# Patient Record
Sex: MALE | Race: ASIAN | NOT HISPANIC OR LATINO | URBAN - METROPOLITAN AREA
[De-identification: names, ages, dates, MRNs, and addresses within clinical notes are randomized per-mention and may not be internally consistent; named-entity substitution may affect disease eponyms.]

---

## 2019-12-31 ENCOUNTER — EMERGENCY (EMERGENCY)
Facility: HOSPITAL | Age: 59
LOS: 1 days | End: 2019-12-31
Attending: EMERGENCY MEDICINE
Payer: COMMERCIAL

## 2019-12-31 VITALS
RESPIRATION RATE: 18 BRPM | WEIGHT: 164.91 LBS | HEIGHT: 71 IN | DIASTOLIC BLOOD PRESSURE: 68 MMHG | TEMPERATURE: 99 F | HEART RATE: 75 BPM | SYSTOLIC BLOOD PRESSURE: 104 MMHG

## 2019-12-31 LAB
ALBUMIN SERPL ELPH-MCNC: 4.1 G/DL — SIGNIFICANT CHANGE UP (ref 3.3–5)
ALP SERPL-CCNC: 135 U/L — HIGH (ref 40–120)
ALT FLD-CCNC: 19 U/L — SIGNIFICANT CHANGE UP (ref 10–45)
ANION GAP SERPL CALC-SCNC: 17 MMOL/L — SIGNIFICANT CHANGE UP (ref 5–17)
AST SERPL-CCNC: 22 U/L — SIGNIFICANT CHANGE UP (ref 10–40)
BASOPHILS # BLD AUTO: 0 K/UL — SIGNIFICANT CHANGE UP (ref 0–0.2)
BASOPHILS NFR BLD AUTO: 0 % — SIGNIFICANT CHANGE UP (ref 0–2)
BILIRUB SERPL-MCNC: 0.3 MG/DL — SIGNIFICANT CHANGE UP (ref 0.2–1.2)
BUN SERPL-MCNC: 13 MG/DL — SIGNIFICANT CHANGE UP (ref 7–23)
CALCIUM SERPL-MCNC: 9 MG/DL — SIGNIFICANT CHANGE UP (ref 8.4–10.5)
CHLORIDE SERPL-SCNC: 99 MMOL/L — SIGNIFICANT CHANGE UP (ref 96–108)
CO2 SERPL-SCNC: 20 MMOL/L — LOW (ref 22–31)
CREAT SERPL-MCNC: 0.53 MG/DL — SIGNIFICANT CHANGE UP (ref 0.5–1.3)
EOSINOPHIL # BLD AUTO: 0.07 K/UL — SIGNIFICANT CHANGE UP (ref 0–0.5)
EOSINOPHIL NFR BLD AUTO: 2 % — SIGNIFICANT CHANGE UP (ref 0–6)
GLUCOSE SERPL-MCNC: 184 MG/DL — HIGH (ref 70–99)
HCT VFR BLD CALC: 41.5 % — SIGNIFICANT CHANGE UP (ref 39–50)
HGB BLD-MCNC: 13.7 G/DL — SIGNIFICANT CHANGE UP (ref 13–17)
LYMPHOCYTES # BLD AUTO: 0.88 K/UL — LOW (ref 1–3.3)
LYMPHOCYTES # BLD AUTO: 24 % — SIGNIFICANT CHANGE UP (ref 13–44)
MAGNESIUM SERPL-MCNC: 2.4 MG/DL — SIGNIFICANT CHANGE UP (ref 1.6–2.6)
MANUAL SMEAR VERIFICATION: SIGNIFICANT CHANGE UP
MCHC RBC-ENTMCNC: 29.5 PG — SIGNIFICANT CHANGE UP (ref 27–34)
MCHC RBC-ENTMCNC: 33 GM/DL — SIGNIFICANT CHANGE UP (ref 32–36)
MCV RBC AUTO: 89.2 FL — SIGNIFICANT CHANGE UP (ref 80–100)
MONOCYTES # BLD AUTO: 0.4 K/UL — SIGNIFICANT CHANGE UP (ref 0–0.9)
MONOCYTES NFR BLD AUTO: 11 % — SIGNIFICANT CHANGE UP (ref 2–14)
NEUTROPHILS # BLD AUTO: 1.98 K/UL — SIGNIFICANT CHANGE UP (ref 1.8–7.4)
NEUTROPHILS NFR BLD AUTO: 53 % — SIGNIFICANT CHANGE UP (ref 43–77)
NEUTS BAND # BLD: 1 % — SIGNIFICANT CHANGE UP (ref 0–8)
NRBC # BLD: 0 /100 — SIGNIFICANT CHANGE UP (ref 0–0)
PHOSPHATE SERPL-MCNC: 3.9 MG/DL — SIGNIFICANT CHANGE UP (ref 2.5–4.5)
PLAT MORPH BLD: NORMAL — SIGNIFICANT CHANGE UP
PLATELET # BLD AUTO: 235 K/UL — SIGNIFICANT CHANGE UP (ref 150–400)
POTASSIUM SERPL-MCNC: 4.3 MMOL/L — SIGNIFICANT CHANGE UP (ref 3.5–5.3)
POTASSIUM SERPL-SCNC: 4.3 MMOL/L — SIGNIFICANT CHANGE UP (ref 3.5–5.3)
PROT SERPL-MCNC: 7.7 G/DL — SIGNIFICANT CHANGE UP (ref 6–8.3)
RBC # BLD: 4.65 M/UL — SIGNIFICANT CHANGE UP (ref 4.2–5.8)
RBC # FLD: 12.6 % — SIGNIFICANT CHANGE UP (ref 10.3–14.5)
RBC BLD AUTO: NORMAL — SIGNIFICANT CHANGE UP
SODIUM SERPL-SCNC: 136 MMOL/L — SIGNIFICANT CHANGE UP (ref 135–145)
VARIANT LYMPHS # BLD: 9 % — HIGH (ref 0–6)
WBC # BLD: 3.67 K/UL — LOW (ref 3.8–10.5)
WBC # FLD AUTO: 3.67 K/UL — LOW (ref 3.8–10.5)

## 2019-12-31 PROCEDURE — 99284 EMERGENCY DEPT VISIT MOD MDM: CPT | Mod: 25

## 2019-12-31 PROCEDURE — 70487 CT MAXILLOFACIAL W/DYE: CPT | Mod: 26

## 2019-12-31 PROCEDURE — 80053 COMPREHEN METABOLIC PANEL: CPT

## 2019-12-31 PROCEDURE — 85027 COMPLETE CBC AUTOMATED: CPT

## 2019-12-31 PROCEDURE — 84100 ASSAY OF PHOSPHORUS: CPT

## 2019-12-31 PROCEDURE — 70487 CT MAXILLOFACIAL W/DYE: CPT

## 2019-12-31 PROCEDURE — 83735 ASSAY OF MAGNESIUM: CPT

## 2019-12-31 PROCEDURE — 99284 EMERGENCY DEPT VISIT MOD MDM: CPT

## 2019-12-31 RX ORDER — SODIUM CHLORIDE 9 MG/ML
1000 INJECTION INTRAMUSCULAR; INTRAVENOUS; SUBCUTANEOUS ONCE
Refills: 0 | Status: COMPLETED | OUTPATIENT
Start: 2019-12-31 | End: 2019-12-31

## 2019-12-31 RX ADMIN — SODIUM CHLORIDE 1000 MILLILITER(S): 9 INJECTION INTRAMUSCULAR; INTRAVENOUS; SUBCUTANEOUS at 22:17

## 2019-12-31 NOTE — ED PROVIDER NOTE - PATIENT PORTAL LINK FT
You can access the FollowMyHealth Patient Portal offered by [facility] by registering at the following website: [email or web address]. By joining [employer]’s FollowMyHealth portal, you will also be able to view your health information using other applications (apps) compatible with our system.

## 2019-12-31 NOTE — ED PROVIDER NOTE - ATTENDING CONTRIBUTION TO CARE
Pt with b/l jaw pain and swelling.  Max temp at home 99F.  No palpable LNA, +swelling and td at angle of jaw, clear lungs, abdomen soft, nt.  Advised pt if workup today is negative, will need to see PCP with today's ct and lab results for concern for possible viral vs recurrence of malignancy process.

## 2019-12-31 NOTE — ED PROVIDER NOTE - OBJECTIVE STATEMENT
59M PMH HTN, HLD, gastric cancer s/p resection presenting with acute onset bilateral cheek swelling x 2 days.  Pt states he started noticing his cheeks were swollen yesterday around parotid gland.  Denies discharge at this time. Pt denies fevers, chills, nausea, vomiting.  Denies sick contacts.  Pt came to ED today as family insisted he come for further care.

## 2019-12-31 NOTE — ED PROVIDER NOTE - PROGRESS NOTE DETAILS
Dr. Cortes Note: pt was told to f/u PCP at scheduled appt Monday with copies of all results for further treatment and possible further testing given prior h/o gastric cancer.

## 2019-12-31 NOTE — ED ADULT NURSE NOTE - OBJECTIVE STATEMENT
59YOM pmh abdominal CA 2011, in recovery presents to ED c/o bilateral jaw swelling x 2 days. Pt endorses ear tingling bilaterally, Bilateral jaw noted to be mindly swollen.  Denies HA, weakness, SOB, CP, abd pain, N/V/D, burning upon urination.  Safety and comfort measures provided. Will continue to monitor.

## 2019-12-31 NOTE — ED ADULT NURSE NOTE - NSIMPLEMENTINTERV_GEN_ALL_ED
Implemented All Universal Safety Interventions:  [place] to call system. Call bell, personal items and telephone within reach. Instruct patient to call for assistance. Room bathroom lighting operational. Non-slip footwear when patient is off stretcher. Physically safe environment: no spills, clutter or unnecessary equipment. Stretcher in lowest position, wheels locked, appropriate side rails in place.

## 2020-01-01 VITALS
RESPIRATION RATE: 18 BRPM | HEART RATE: 67 BPM | DIASTOLIC BLOOD PRESSURE: 64 MMHG | SYSTOLIC BLOOD PRESSURE: 101 MMHG | OXYGEN SATURATION: 99 % | TEMPERATURE: 99 F

## 2021-09-06 ENCOUNTER — EMERGENCY (EMERGENCY)
Facility: HOSPITAL | Age: 61
LOS: 1 days | Discharge: HOME | End: 2021-09-06
Admitting: EMERGENCY MEDICINE
Payer: MEDICARE

## 2021-09-06 VITALS
HEIGHT: 71 IN | HEART RATE: 68 BPM | RESPIRATION RATE: 20 BRPM | OXYGEN SATURATION: 98 % | DIASTOLIC BLOOD PRESSURE: 63 MMHG | SYSTOLIC BLOOD PRESSURE: 120 MMHG | TEMPERATURE: 98 F

## 2021-09-06 PROCEDURE — 12011 RPR F/E/E/N/L/M 2.5 CM/<: CPT

## 2021-09-06 PROCEDURE — 99283 EMERGENCY DEPT VISIT LOW MDM: CPT | Mod: 25

## 2021-09-06 RX ORDER — LIDOCAINE HYDROCHLORIDE AND EPINEPHRINE 10; 10 MG/ML; UG/ML
10 INJECTION, SOLUTION INFILTRATION; PERINEURAL ONCE
Refills: 0 | Status: COMPLETED | OUTPATIENT
Start: 2021-09-06 | End: 2021-09-06

## 2021-09-06 RX ORDER — TETANUS TOXOID, REDUCED DIPHTHERIA TOXOID AND ACELLULAR PERTUSSIS VACCINE, ADSORBED 5; 2.5; 8; 8; 2.5 [IU]/.5ML; [IU]/.5ML; UG/.5ML; UG/.5ML; UG/.5ML
0.5 SUSPENSION INTRAMUSCULAR ONCE
Refills: 0 | Status: COMPLETED | OUTPATIENT
Start: 2021-09-06 | End: 2021-09-06

## 2021-09-06 RX ADMIN — TETANUS TOXOID, REDUCED DIPHTHERIA TOXOID AND ACELLULAR PERTUSSIS VACCINE, ADSORBED 0.5 MILLILITER(S): 5; 2.5; 8; 8; 2.5 SUSPENSION INTRAMUSCULAR at 14:20

## 2021-09-06 RX ADMIN — LIDOCAINE HYDROCHLORIDE AND EPINEPHRINE 10 MILLILITER(S): 10; 10 INJECTION, SOLUTION INFILTRATION; PERINEURAL at 14:22

## 2021-09-06 NOTE — ED PROVIDER NOTE - TOBACCO USE
Report received from CASSIUS choe--pt resting on cart. Pending bed assignment. Call light within reach. On cardiac monitor   Current every day smoker

## 2021-09-06 NOTE — ED PROVIDER NOTE - OBJECTIVE STATEMENT
60 y/o M denies pmh c/o laceration to forehead occurred at 2am last night. No LOC. Tdap status unknown. 60 y/o M denies pmh c/o laceration to forehead occurred at 2am last night. No LOC. Tdap status unknown. Pt reports he was partying and drinking and fell on his forehead. witness. no loc. denies changes in vision, n/v, neck pain. Able to walk with steady gait.

## 2021-09-06 NOTE — ED PROVIDER NOTE - NSFOLLOWUPINSTRUCTIONS_ED_ALL_ED_FT
Keep sutures covered and dry for 24 hours then clean with soap and tepid water daily.  Apply bacitracin and cover.  Return to ED for suture removal in 7 days. Please return immediately to the Emergency Department if you have any worsening or change in your condition or if you have any other problems or concerns at all, including but not limited to any increased pain, redness, streaking (red lines), swelling, fever, chills.

## 2021-09-06 NOTE — ED PROVIDER NOTE - PATIENT PORTAL LINK FT
You can access the FollowMyHealth Patient Portal offered by [facility] by registering at the following website: [email or web address]. By joining [employer]’s FollowMyHealth portal, you will also be able to view your health information using other applications (apps) compatible with our system.

## 2022-05-10 ENCOUNTER — EMERGENCY (EMERGENCY)
Facility: HOSPITAL | Age: 62
LOS: 1 days | Discharge: HOME | End: 2022-05-10
Attending: EMERGENCY MEDICINE | Admitting: EMERGENCY MEDICINE
Payer: MEDICARE

## 2022-05-10 VITALS
TEMPERATURE: 99 F | RESPIRATION RATE: 16 BRPM | SYSTOLIC BLOOD PRESSURE: 117 MMHG | OXYGEN SATURATION: 100 % | HEART RATE: 60 BPM | DIASTOLIC BLOOD PRESSURE: 86 MMHG

## 2022-05-10 VITALS
RESPIRATION RATE: 16 BRPM | HEIGHT: 71 IN | OXYGEN SATURATION: 100 % | SYSTOLIC BLOOD PRESSURE: 112 MMHG | HEART RATE: 69 BPM | DIASTOLIC BLOOD PRESSURE: 70 MMHG | TEMPERATURE: 98 F

## 2022-05-10 DIAGNOSIS — Z98.890 OTHER SPECIFIED POSTPROCEDURAL STATES: Chronic | ICD-10-CM

## 2022-05-10 PROCEDURE — 73502 X-RAY EXAM HIP UNI 2-3 VIEWS: CPT | Mod: 26,RT

## 2022-05-10 PROCEDURE — 73552 X-RAY EXAM OF FEMUR 2/>: CPT | Mod: 26,RT

## 2022-05-10 PROCEDURE — 99284 EMERGENCY DEPT VISIT MOD MDM: CPT

## 2022-05-10 RX ORDER — ACETAMINOPHEN 500 MG
650 TABLET ORAL ONCE
Refills: 0 | Status: COMPLETED | OUTPATIENT
Start: 2022-05-10 | End: 2022-05-10

## 2022-05-10 RX ORDER — LIDOCAINE 4 G/100G
1 CREAM TOPICAL ONCE
Refills: 0 | Status: COMPLETED | OUTPATIENT
Start: 2022-05-10 | End: 2022-05-10

## 2022-05-10 RX ADMIN — LIDOCAINE 1 PATCH: 4 CREAM TOPICAL at 16:11

## 2022-05-10 RX ADMIN — Medication 650 MILLIGRAM(S): at 16:12

## 2022-05-10 NOTE — ED PROVIDER NOTE - PATIENT PORTAL LINK FT
You can access the FollowMyHealth Patient Portal offered by [facility] by registering at the following website: [email or web address]. By joining [employer]’s FollowMyHealth portal, you will also be able to view your health information using other applications (apps) compatible with our system.

## 2022-05-10 NOTE — ED PROVIDER NOTE - NSFOLLOWUPCLINICS_GEN_ALL_ED_FT
[facility]  Orthopedic Surgery  [address], [address] [place] [address]  Phone: [phone]  Fax:   Follow Up Time: 4-6 Days

## 2022-05-10 NOTE — ED PROVIDER NOTE - NSFOLLOWUPINSTRUCTIONS_ED_ALL_ED_FT
1) Follow up with orthopedist within 1 week of being seen in the ED  2) Return to the ED immediately for new or worsening symptoms severe pain, fever, chills, unable to move the joint, unable to walk, numbness  3) Please continue to take any home medications as prescribed  4) Your test results from your ED visit were discussed with you prior to discharge  5) You were provided with a copy of your test results  6) Please take 650 mg Tylenol every 6 hours as needed for pain. Please buy Salonpas Lidocaine patches over the counter at the pharmacy. Use as directed on packaging.

## 2022-05-10 NOTE — ED PROVIDER NOTE - PROGRESS NOTE DETAILS
Lazaro Wayne, PGY-2- Patient reevaluated, XRs reviewed and negative for acute fx. Pt Lazaro Wayne, PGY-2- Patient reevaluated, XRs reviewed and negative for acute fx. Pt feels well and is ambulatory. Will discharge home with ortho follow up. Discussed results of work up with patient. Patient agrees with plan to discharge home. All questions answered regarding plan. Strict return precautions given.

## 2022-05-10 NOTE — ED PROVIDER NOTE - OBJECTIVE STATEMENT
61 y/o m presents to the ED with r. hip/femur pain. Started 4 d ago, atraumatic, constant, severe, has not tried anything for the pain. No fevers, chills, redness, warmth, swelling, weakness, numbness, cough, cp, sob, abd pain, vomiting, diarrhea, dysuria, edema. Patient has h/o gastric CA s/p tx, now in remission. No other medical problems or concerns.

## 2022-05-10 NOTE — ED ADULT TRIAGE NOTE - CHIEF COMPLAINT QUOTE
Pt c/o R hip pain  with difficulty ambulating x 4 days, worse  over the past 2 days.  Pt with gastric cancer in remission as per family

## 2022-05-10 NOTE — ED PROVIDER NOTE - CLINICAL SUMMARY MEDICAL DECISION MAKING FREE TEXT BOX
63 y/o m presents to the ed with r. hip/femur pain, atraumatic x last 4d, no other symptoms, tenderness localized to prox femur, no swelling/deformity/bruising, ext warm and well perfused, no skin changes, no systemic symptoms. Will check xrays to eval for fractures/bony lesions, pain control.

## 2022-05-10 NOTE — ED ADULT NURSE NOTE - OBJECTIVE STATEMENT
Pt is 63 y/o male c/o atraumatic right hip pain worse with walking, Pt is ambulatory and can bear weight on affected limb, Right leg is not externally rotated legs appear equal in length Pt is calm and without pain at rest.  meds as ordered.

## 2022-05-10 NOTE — ED PROVIDER NOTE - NS ED ROS FT
ROS:  GENERAL: No fever, no chills  EYES: no change in vision  HEENT: no trouble swallowing, no trouble speaking  CARDIAC: no chest pain  PULMONARY: no cough, no shortness of breath  GI: no abdominal pain, no nausea, no vomiting, no diarrhea, no constipation  : No dysuria, no frequency, no change in appearance, or odor of urine  SKIN: no rashes  NEURO: no headache, no weakness  MSK: +r. hip/femur pain

## 2022-05-10 NOTE — ED PROVIDER NOTE - PHYSICAL EXAMINATION
GEN - NAD; well appearing; A+O x3   HEAD - NC/AT   EYES- PERRL, EOMI  ENT: Airway patent, mmm, Oral cavity and pharynx normal. No inflammation, swelling, exudate, or lesions.    NECK: Neck supple  PULMONARY - CTA b/l, symmetric breath sounds.   CARDIAC -s1s2, RRR, no M,G,R  ABDOMEN - +BS, ND, NT, soft, no guarding, no rebound, no masses   BACK - no CVA tenderness, Normal  spine   EXTREMITIES - FROM to all joints, r. prox femur with + ttp, no swelling/deformity/bruising, pelvis stable, compartments soft, no skin changes, no edema. 2+ Dp pulses b/l, ambulates in ED with steady gait  SKIN - no rash or bruising   NEUROLOGIC - alert, speech clear, full strength all ext, sensation intact  PSYCH -nl mood/affect, nl insight.

## 2023-05-17 ENCOUNTER — EMERGENCY (EMERGENCY)
Facility: HOSPITAL | Age: 63
LOS: 1 days | Discharge: HOME | End: 2023-05-17
Admitting: EMERGENCY MEDICINE
Payer: MEDICARE

## 2023-05-17 VITALS
RESPIRATION RATE: 16 BRPM | SYSTOLIC BLOOD PRESSURE: 135 MMHG | TEMPERATURE: 98 F | OXYGEN SATURATION: 100 % | HEART RATE: 86 BPM | DIASTOLIC BLOOD PRESSURE: 89 MMHG

## 2023-05-17 DIAGNOSIS — Z98.890 OTHER SPECIFIED POSTPROCEDURAL STATES: Chronic | ICD-10-CM

## 2023-05-17 PROCEDURE — 99284 EMERGENCY DEPT VISIT MOD MDM: CPT

## 2023-05-17 PROCEDURE — 73080 X-RAY EXAM OF ELBOW: CPT | Mod: 26,LT

## 2023-05-17 PROCEDURE — 93010 ELECTROCARDIOGRAM REPORT: CPT

## 2023-05-17 RX ORDER — IBUPROFEN 200 MG
600 TABLET ORAL ONCE
Refills: 0 | Status: COMPLETED | OUTPATIENT
Start: 2023-05-17 | End: 2023-05-17

## 2023-05-17 RX ADMIN — Medication 600 MILLIGRAM(S): at 23:39

## 2023-05-17 NOTE — ED ADULT TRIAGE NOTE - AS PAIN REST
7 (severe pain) Cibinqo Counseling: I discussed with the patient the risks of Cibinqo therapy including but not limited to common cold, nausea, headache, cold sores, increased blood CPK levels, dizziness, UTIs, fatigue, acne, and vomitting. Live vaccines should be avoided.  This medication has been linked to serious infections; higher rate of mortality; malignancy and lymphoproliferative disorders; major adverse cardiovascular events; thrombosis; thrombocytopenia and lymphopenia; lipid elevations; and retinal detachment.

## 2023-05-17 NOTE — ED ADULT TRIAGE NOTE - CHIEF COMPLAINT QUOTE
Patient c/o R elbow pain. States elbow is swollen and warm to touch. No redness. Endorses pain to neck and L shoulder. No CP/SOB, no fevers/chills. PMH CABG, HTN, HLD.

## 2023-05-18 VITALS
HEART RATE: 60 BPM | RESPIRATION RATE: 18 BRPM | TEMPERATURE: 97 F | DIASTOLIC BLOOD PRESSURE: 77 MMHG | OXYGEN SATURATION: 98 % | SYSTOLIC BLOOD PRESSURE: 141 MMHG

## 2023-05-18 PROBLEM — C16.9 MALIGNANT NEOPLASM OF STOMACH, UNSPECIFIED: Chronic | Status: ACTIVE | Noted: 2022-05-10

## 2023-05-18 LAB
ALBUMIN SERPL ELPH-MCNC: 4 G/DL — SIGNIFICANT CHANGE UP (ref 3.3–5)
ALP SERPL-CCNC: 122 U/L — HIGH (ref 40–120)
ALT FLD-CCNC: 16 U/L — SIGNIFICANT CHANGE UP (ref 4–41)
ANION GAP SERPL CALC-SCNC: 16 MMOL/L — HIGH (ref 7–14)
AST SERPL-CCNC: 27 U/L — SIGNIFICANT CHANGE UP (ref 4–40)
BASOPHILS # BLD AUTO: 0.01 K/UL — SIGNIFICANT CHANGE UP (ref 0–0.2)
BASOPHILS NFR BLD AUTO: 0.2 % — SIGNIFICANT CHANGE UP (ref 0–2)
BILIRUB SERPL-MCNC: 0.3 MG/DL — SIGNIFICANT CHANGE UP (ref 0.2–1.2)
BUN SERPL-MCNC: 18 MG/DL — SIGNIFICANT CHANGE UP (ref 7–23)
CALCIUM SERPL-MCNC: 9.2 MG/DL — SIGNIFICANT CHANGE UP (ref 8.4–10.5)
CHLORIDE SERPL-SCNC: 103 MMOL/L — SIGNIFICANT CHANGE UP (ref 98–107)
CO2 SERPL-SCNC: 19 MMOL/L — LOW (ref 22–31)
CREAT SERPL-MCNC: 0.58 MG/DL — SIGNIFICANT CHANGE UP (ref 0.5–1.3)
CRP SERPL-MCNC: 4.6 MG/L — SIGNIFICANT CHANGE UP
EGFR: 108 ML/MIN/1.73M2 — SIGNIFICANT CHANGE UP
EOSINOPHIL # BLD AUTO: 0.2 K/UL — SIGNIFICANT CHANGE UP (ref 0–0.5)
EOSINOPHIL NFR BLD AUTO: 3.5 % — SIGNIFICANT CHANGE UP (ref 0–6)
ERYTHROCYTE [SEDIMENTATION RATE] IN BLOOD: 21 MM/HR — HIGH (ref 1–15)
GLUCOSE SERPL-MCNC: 113 MG/DL — HIGH (ref 70–99)
HCT VFR BLD CALC: 36.4 % — LOW (ref 39–50)
HGB BLD-MCNC: 12.1 G/DL — LOW (ref 13–17)
IANC: 2.62 K/UL — SIGNIFICANT CHANGE UP (ref 1.8–7.4)
IMM GRANULOCYTES NFR BLD AUTO: 0.2 % — SIGNIFICANT CHANGE UP (ref 0–0.9)
LYMPHOCYTES # BLD AUTO: 2.47 K/UL — SIGNIFICANT CHANGE UP (ref 1–3.3)
LYMPHOCYTES # BLD AUTO: 42.9 % — SIGNIFICANT CHANGE UP (ref 13–44)
MCHC RBC-ENTMCNC: 28.7 PG — SIGNIFICANT CHANGE UP (ref 27–34)
MCHC RBC-ENTMCNC: 33.2 GM/DL — SIGNIFICANT CHANGE UP (ref 32–36)
MCV RBC AUTO: 86.5 FL — SIGNIFICANT CHANGE UP (ref 80–100)
MONOCYTES # BLD AUTO: 0.45 K/UL — SIGNIFICANT CHANGE UP (ref 0–0.9)
MONOCYTES NFR BLD AUTO: 7.8 % — SIGNIFICANT CHANGE UP (ref 2–14)
NEUTROPHILS # BLD AUTO: 2.62 K/UL — SIGNIFICANT CHANGE UP (ref 1.8–7.4)
NEUTROPHILS NFR BLD AUTO: 45.4 % — SIGNIFICANT CHANGE UP (ref 43–77)
NRBC # BLD: 0 /100 WBCS — SIGNIFICANT CHANGE UP (ref 0–0)
NRBC # FLD: 0 K/UL — SIGNIFICANT CHANGE UP (ref 0–0)
PLATELET # BLD AUTO: 227 K/UL — SIGNIFICANT CHANGE UP (ref 150–400)
POTASSIUM SERPL-MCNC: 4.5 MMOL/L — SIGNIFICANT CHANGE UP (ref 3.5–5.3)
POTASSIUM SERPL-SCNC: 4.5 MMOL/L — SIGNIFICANT CHANGE UP (ref 3.5–5.3)
PROT SERPL-MCNC: 7 G/DL — SIGNIFICANT CHANGE UP (ref 6–8.3)
RBC # BLD: 4.21 M/UL — SIGNIFICANT CHANGE UP (ref 4.2–5.8)
RBC # FLD: 15.5 % — HIGH (ref 10.3–14.5)
SODIUM SERPL-SCNC: 138 MMOL/L — SIGNIFICANT CHANGE UP (ref 135–145)
URATE SERPL-MCNC: 5.6 MG/DL — SIGNIFICANT CHANGE UP (ref 3.4–8.8)
WBC # BLD: 5.76 K/UL — SIGNIFICANT CHANGE UP (ref 3.8–10.5)
WBC # FLD AUTO: 5.76 K/UL — SIGNIFICANT CHANGE UP (ref 3.8–10.5)

## 2023-05-18 RX ORDER — IBUPROFEN 200 MG
1 TABLET ORAL
Qty: 30 | Refills: 0
Start: 2023-05-18 | End: 2023-05-27

## 2023-05-18 NOTE — ED PROVIDER NOTE - OBJECTIVE STATEMENT
63-year-old male with past medical history of HTN, HLD, CABG presents to the ER complaining of right atraumatic elbow pain with swelling and warmth for the past 3 days.  Pain and swelling improved with Aleve that was taken yesterday.  However patient was concerned that the swelling was still present so presents to the ER for evaluation. patient denies injury /trauma/ falls /wounds, fevers, chest pain, shortness of breath, nausea, vomiting, weakness, numbness, tingling.

## 2023-05-18 NOTE — ED PROVIDER NOTE - PROGRESS NOTE DETAILS
Pt states pain and swelling is improved and would like to go home. XR WNL and no WBC count. Uric acid and c-reactive protein. on exam no erythema or hot joint. Pt has FROM of joint both passive and actively. Will d/c home with ibuprofen as it controlled sxs well in ER. Discussed strict return precautions and f/u with PMD.

## 2023-05-18 NOTE — ED PROVIDER NOTE - NSFOLLOWUPCLINICS_GEN_ALL_ED_FT
[facility] [place]  Orth[facility]  .  [place]   Phone: [phone]  Fax:

## 2023-05-18 NOTE — ED PROVIDER NOTE - CLINICAL SUMMARY MEDICAL DECISION MAKING FREE TEXT BOX
63-year-old male with past medical history of HTN, HLD, CABG presents to the ER complaining of right atraumatic elbow pain with swelling and warmth for the past 3 days.  Pain and swelling improved with Aleve that was taken yesterday.  However patient was concerned that the swelling was still present so presents to the ER for evaluation.    likely arthritis/ gout.   FROM and no fever to suggest septic joint.   will xray, labs, uric acid, esr/ crp.   NSAIDs, reassess likely dc with pmd/ ortho/ rheum f/u

## 2023-05-18 NOTE — ED PROVIDER NOTE - NSFOLLOWUPINSTRUCTIONS_ED_ALL_ED_FT
What is elbow tendinopathy?  Elbow tendinopathy is a condition that causes elbow pain and forearm weakness. The word "tendinopathy" refers to a problem with a tendon. Tendons are strong bands of tissue that connect muscles to bones.    Depending on which elbow tendon is injured, the condition is also known as "tennis elbow" or "golf elbow." Doctors also used to call this condition "tendinitis."    What causes elbow tendinopathy?  This condition can happen as people get older, especially if they do a lot of work or activity using their elbow and forearm. It can also happen when people get hurt or do the same movements over and over.    The terms "tennis elbow" and "golf elbow" refer to the swinging motion people do in these sports. This can cause tendinopathy. But other activities or jobs can also cause this problem if they involve similar movements.    What are the symptoms of elbow tendinopathy?  The most common symptoms are:    ?Elbow pain – This is the main symptom of elbow tendinopathy. Pain can start slowly or suddenly, and can be mild or more severe. It can spread to the upper arm or forearm. Pain is most common when the tendon is working or stretched.    ?Muscle weakness – The forearm muscles might feel weak when you  or squeeze something.    ?Swelling – Some people might have mild swelling in the elbow area.    Will I need tests?  Maybe. Your doctor or nurse should be able to tell if you have elbow tendinopathy by talking with you and doing an exam. They might also have you do specific arm movements to better understand what motions or activities cause pain.    In some cases, the doctor might also do an imaging test, such as an ultrasound. Imaging tests create pictures of the inside of the body.    How is elbow tendinopathy treated?  Most of the time, it gets better on its own, but it can take months to heal completely. To help get better, you can:    ?Rest your elbow and arm – If possible, try to avoid or reduce activities that make your pain worse.    ?Wear a brace or sleeve – Ask your doctor or nurse about this. Wearing a special brace or "compression sleeve" can help support your elbow and relieve pain. These work by reducing strain on the tendon when you use your arm.    ?Take a pain-relieving medicine – Your doctor might recommend that you take a medicine such as acetaminophen (sample brand name: Tylenol), ibuprofen (sample brand names: Advil, Motrin), or naproxen (sample brand names: Aleve, Naprosyn).    ?Put ice on your elbow – This might help after doing activities that make your pain worse. Put a cold gel pack, bag of ice, or bag of frozen vegetables on the area every 1 to 2 hours, for 15 minutes each time. Put a thin towel between the ice (or other cold object) and your skin.    ?Get physical therapy – This involves learning specific exercises to strengthen your muscles. This can help with your symptoms. The right exercises for you depend on which elbow tendon is injured. Your doctor or nurse can show you how to do these types of exercises. They will tell you when to start them and how often to do them. They might also refer you to a physical therapist (exercise expert).    Stretching the muscles in your lower arm can also be helpful. Depending on which tendon is injured, you can do stretches like:    •Tennis elbow stretch (also called forearm extensor stretch) – Hold your injured arm straight out, and point your fingers down to the ground. Use your other hand (with the thumb pressing on the palm) to grab this hand. Then, press down on the back of the hand to bend the wrist more (picture 1). Hold this position for 30 seconds. Repeat the stretch 3 times. Do this exercise 1 time a day.    •Golf elbow stretch – Stand an arm's length away from the wall, with the injured arm closest to the wall. Put your palm on the wall, with your fingers pointing down. Press gently against the wall to stretch your muscles (picture 2). Hold this position for 30 seconds. Repeat the stretch 3 times. Do this exercise 1 time a day.    What if my symptoms don't get better?  If you have been doing your exercises for at least 8 to 12 weeks and your symptoms are not getting better, talk with your doctor or nurse. They can suggest other treatments that might help. They might also want to do imaging tests, like an ultrasound or X-ray, to see if something else might be causing your symptoms.    Rarely, elbow tendinopathy is treated with surgery. This might be considered if other treatments do not help after many months. But the condition usually gets better without surgery.    Can elbow tendinopathy be prevented?  Yes. To help prevent elbow tendinopathy, you can:    ?Take breaks when you do activities that involve moving your elbow and wrist a lot.    ?Keep your elbows slightly bent when you exercise or lift things.    ?Wear gloves or use 2 hands when using tools.    ?If you play tennis, use a 2-handed backhand swing.    ?If you play golf, use  tape or padding on your golf clubs.

## 2023-05-18 NOTE — ED ADULT NURSE NOTE - NSFALLUNIVINTERV_ED_ALL_ED
Bed/Stretcher in lowest position, wheels locked, appropriate side rails in place/Call bell, personal items and telephone in reach/Instruct patient to call for assistance before getting out of bed/chair/stretcher/Non-slip footwear applied when patient is off stretcher/[place] to call system/Physically safe environment - no spills, clutter or unnecessary equipment/Purposeful proactive rounding/Room/bathroom lighting operational, light cord in reach

## 2023-05-18 NOTE — ED PROVIDER NOTE - PHYSICAL EXAMINATION
Well appearing, well nourished, awake, alert, oriented to person, place, time/situation and in no apparent distress.    Airway patent    Eyes without scleral injection. No jaundice.    Strong pulse.    Respirations unlabored.    Spine appears normal, range of motion is not limited, no muscle or joint tenderness. RUE: FROM of all joints throughout, elbow with mild swelling and warmth with no erythema/ skin changes/ wounds. 5/5 strength, sensation intact, pulses intact. LUE: FROM, 5/5 strength, sensation and pulses intact.     Alert and oriented, no gross motor or sensory deficits.    Skin normal color for race, warm, dry and intact. No evidence of rash.

## 2023-05-18 NOTE — ED ADULT NURSE NOTE - OBJECTIVE STATEMENT
Pt received to intake 15. Pt A&Ox4, ambulatory at baseline. Pt c/o atraumatic L elbow pain. Endorses swelling to site. Denies any trauma, fevers, chills, CP, SOB. RR even and unlabored. IV access established with 20G to R Wrist, labs collected and sent as per orders. Safety in place, pending XRAY Results

## 2023-05-18 NOTE — ED PROVIDER NOTE - PATIENT PORTAL LINK FT
You can access the FollowMyHealth Patient Portal offered by [facility] by registering at the following website: [email or web address]. By joining [employer]’s FollowMyHealth portal, you will also be able to view your health information using other applications (apps) compatible with our system.

## 2023-05-19 NOTE — ED POST DISCHARGE NOTE - RESULT SUMMARY
Pt's daughter "Columba" contacted the ED, requesting information about pt's ED visit on 5/17/23.  Pt came on the phone, verified information about his visit, including approx. time of arrival and time of discharge.  Pt gave verbal permission to discuss all info with his daughter.  Daughter's questions were answered; test results from ED visit were reviewed with pt and daughter with all incidental findings from labs/imaging discussed.  Pt states he is experiencing new urinary frequency/urgency and having "fever sweats"; pt was advised to return to the Emergency Department for reevaluation.  Daughter also states pt is experiencing new hand swelling in same arm as that where pt had elbow swelling; it was explained that there could be different possibilities for this, but if pt is having multiple new symptoms, it was advised that pt return to the Emergency Department for prompt re-evaluation; pt and daughter verbalized understanding.

## 2024-08-30 ENCOUNTER — EMERGENCY (EMERGENCY)
Facility: HOSPITAL | Age: 64
LOS: 1 days | Discharge: HOME | End: 2024-08-30
Admitting: STUDENT IN AN ORGANIZED HEALTH CARE EDUCATION/TRAINING PROGRAM
Payer: MEDICARE

## 2024-08-30 VITALS
SYSTOLIC BLOOD PRESSURE: 132 MMHG | WEIGHT: 167.99 LBS | TEMPERATURE: 98 F | RESPIRATION RATE: 17 BRPM | HEART RATE: 58 BPM | OXYGEN SATURATION: 100 % | HEIGHT: 70 IN | DIASTOLIC BLOOD PRESSURE: 79 MMHG

## 2024-08-30 DIAGNOSIS — Z98.890 OTHER SPECIFIED POSTPROCEDURAL STATES: Chronic | ICD-10-CM

## 2024-08-30 PROCEDURE — 72040 X-RAY EXAM NECK SPINE 2-3 VW: CPT | Mod: 26

## 2024-08-30 PROCEDURE — 99284 EMERGENCY DEPT VISIT MOD MDM: CPT

## 2024-08-30 PROCEDURE — 93010 ELECTROCARDIOGRAM REPORT: CPT

## 2024-08-30 RX ORDER — CYCLOBENZAPRINE HCL 10 MG
1 TABLET ORAL
Qty: 21 | Refills: 0
Start: 2024-08-30 | End: 2024-09-05

## 2024-08-30 RX ORDER — ACETAMINOPHEN 325 MG/1
650 TABLET ORAL ONCE
Refills: 0 | Status: COMPLETED | OUTPATIENT
Start: 2024-08-30 | End: 2024-08-30

## 2024-08-30 RX ORDER — CYCLOBENZAPRINE HCL 10 MG
5 TABLET ORAL ONCE
Refills: 0 | Status: COMPLETED | OUTPATIENT
Start: 2024-08-30 | End: 2024-08-30

## 2024-08-30 RX ADMIN — ACETAMINOPHEN 650 MILLIGRAM(S): 325 TABLET ORAL at 16:44

## 2024-08-30 RX ADMIN — Medication 5 MILLIGRAM(S): at 16:44

## 2024-08-30 NOTE — ED ADULT NURSE NOTE - OBJECTIVE STATEMENT
Pt received to intake room 10C. Pt A&O x 3, ambulatory. Pt c/o left shoulder pain x 1 week. Pt endorses lifting and having the pain shortly after. Hx of MI with bypass, gastric surgery, DM2, HTN, stents. Pt denies dizziness, headache, vision changes, chest pain, SOB, N&V, or urinary symptoms. Respirations even and unlabored. +2 pulses in all extremities. Medicated as per MD orders. Safety maintained. Pending reassessment and imaging..

## 2024-08-30 NOTE — ED PROVIDER NOTE - PATIENT PORTAL LINK FT
You can access the FollowMyHealth Patient Portal offered by [facility] by registering at the following website: [email or web address]. By joining [employer]’s FollowMyHealth portal, you will also be able to view your health information using other applications (apps) compatible with our system.

## 2024-08-30 NOTE — ED ADULT TRIAGE NOTE - CHIEF COMPLAINT QUOTE
Pt coming to ER c/o left shoulder pain that radiates to the neck. Pt states he was lifting and carrying his bike up three 3 steps and has been hurting since then. Pt denies chest pain at this time. Breathing even and unlabored. Hx HTN, CABG and stent placement. Taking plavix

## 2024-08-30 NOTE — ED PROVIDER NOTE - PROGRESS NOTE DETAILS
xr= no fx   pt states he feels better  flexeril prescribed  he was advised to f/u w/ his pcp/ortho  ED return precautions discussed- prn or if sx worsen.   He was able to ambulate out of the ED w/o assistance.

## 2024-08-30 NOTE — ED PROVIDER NOTE - OBJECTIVE STATEMENT
63 y/o male w/ no pmh reports today c/o b/l shoulder pain w/ neck pain X 1 day. Pt lifted a bicycle prior to sx onset. 63 y/o male w/ no pmh reports today c/o b/l shoulder pain w/ neck pain X 1 day. Pt lifted a bicycle prior to sx onset. he did not try anything for his sx and nothing makes it worse. Denies; cp, sob, abdominal pain, dizziness, ha, pre-syncopal episode, fever, chills, nausea or vomiting.

## 2024-08-30 NOTE — ED PROVIDER NOTE - CLINICAL SUMMARY MEDICAL DECISION MAKING FREE TEXT BOX
65 y/o male w/ no pmh reports today c/o b/l shoulder pain w/ neck pain X 1 day. Pt lifted a bicycle prior to sx onset. he did not try anything for his sx and nothing makes it worse.     mark anthony jerry in etiology  plan for pt- xr c-spine, ekg, apap and flexeril  pt likely will be dc  he is resting comfortably reassess.

## 2024-09-01 NOTE — ED POST DISCHARGE NOTE - RESULT SUMMARY
Peer CASSANDRA :  CErvical spine xray : No definite prevertabral soft tissue swelling . No definite acute displaced fracture seen. However if there is concern for fracture, recommend further evlaution with Cervical spine CT. Patient contacted and discussed with patient Xray results. Offered to email to patient but he doesn't have email Discussed with patient to go on portal to see results and follow up with MD this week . Discussed with patient will mail report but will take a while. Discussed with patient need to return to ED if symptoms don't continue to improve or recur or develops any new or worsening symptoms that are of concern.

## 2024-11-19 ENCOUNTER — TRANSCRIPTION ENCOUNTER (OUTPATIENT)
Age: 64
End: 2024-11-19

## 2025-03-17 ENCOUNTER — TRANSCRIPTION ENCOUNTER (OUTPATIENT)
Age: 65
End: 2025-03-17